# Patient Record
Sex: MALE | URBAN - METROPOLITAN AREA
[De-identification: names, ages, dates, MRNs, and addresses within clinical notes are randomized per-mention and may not be internally consistent; named-entity substitution may affect disease eponyms.]

---

## 2024-07-15 ENCOUNTER — APPOINTMENT (RX ONLY)
Dept: URBAN - METROPOLITAN AREA CLINIC 129 | Facility: CLINIC | Age: 64
Setting detail: DERMATOLOGY
End: 2024-07-15

## 2024-07-15 DIAGNOSIS — D17 BENIGN LIPOMATOUS NEOPLASM: ICD-10-CM

## 2024-07-15 PROBLEM — D48.5 NEOPLASM OF UNCERTAIN BEHAVIOR OF SKIN: Status: ACTIVE | Noted: 2024-07-15

## 2024-07-15 PROCEDURE — ? DEFER

## 2024-07-15 PROCEDURE — 99202 OFFICE O/P NEW SF 15 MIN: CPT

## 2024-07-15 PROCEDURE — ? COUNSELING

## 2024-07-15 PROCEDURE — ? TREATMENT REGIMEN

## 2024-07-15 ASSESSMENT — LOCATION DETAILED DESCRIPTION DERM: LOCATION DETAILED: RIGHT POSTERIOR NECK

## 2024-07-15 ASSESSMENT — LOCATION SIMPLE DESCRIPTION DERM: LOCATION SIMPLE: POSTERIOR NECK

## 2024-07-15 ASSESSMENT — LOCATION ZONE DERM: LOCATION ZONE: NECK

## 2024-07-15 NOTE — PROCEDURE: DEFER
Scheduling Instructions (Optional): 60 minutes
X Size Of Lesion In Cm (Optional): 0
Detail Level: Detailed
Instructions (Optional): 07205, 10725, 72978
Introduction Text (Please End With A Colon): The following procedure was deferred:
Procedure To Be Performed At Next Visit: Excision
Size Of Lesion In Cm (Optional): 7

## 2024-08-24 ENCOUNTER — APPOINTMENT (RX ONLY)
Dept: URBAN - METROPOLITAN AREA CLINIC 129 | Facility: CLINIC | Age: 64
Setting detail: DERMATOLOGY
End: 2024-08-24

## 2024-08-24 DIAGNOSIS — D17 BENIGN LIPOMATOUS NEOPLASM: ICD-10-CM

## 2024-08-24 PROBLEM — D17.30 BENIGN LIPOMATOUS NEOPLASM OF SKIN AND SUBCUTANEOUS TISSUE OF UNSPECIFIED SITES: Status: ACTIVE | Noted: 2024-08-24

## 2024-08-24 PROCEDURE — ? ADDITIONAL NOTES

## 2024-08-24 PROCEDURE — ? REFERRAL

## 2024-08-24 PROCEDURE — 99212 OFFICE O/P EST SF 10 MIN: CPT

## 2024-08-24 PROCEDURE — ? COUNSELING

## 2024-08-24 NOTE — PROCEDURE: ADDITIONAL NOTES
Detail Level: Simple
Additional Notes: Based on pain control, w recommended patient for removal with general surgery.
Render Risk Assessment In Note?: no